# Patient Record
Sex: MALE | Race: WHITE | NOT HISPANIC OR LATINO | URBAN - METROPOLITAN AREA
[De-identification: names, ages, dates, MRNs, and addresses within clinical notes are randomized per-mention and may not be internally consistent; named-entity substitution may affect disease eponyms.]

---

## 2021-05-31 ENCOUNTER — APPOINTMENT (OUTPATIENT)
Dept: RADIOLOGY | Facility: IMAGING CENTER | Age: 26
End: 2021-05-31
Attending: PHYSICIAN ASSISTANT
Payer: OTHER MISCELLANEOUS

## 2021-05-31 ENCOUNTER — OFFICE VISIT (OUTPATIENT)
Dept: URGENT CARE | Facility: CLINIC | Age: 26
End: 2021-05-31
Payer: OTHER MISCELLANEOUS

## 2021-05-31 VITALS
RESPIRATION RATE: 16 BRPM | WEIGHT: 210.4 LBS | DIASTOLIC BLOOD PRESSURE: 74 MMHG | HEIGHT: 72 IN | SYSTOLIC BLOOD PRESSURE: 138 MMHG | TEMPERATURE: 97.5 F | BODY MASS INDEX: 28.5 KG/M2 | HEART RATE: 83 BPM | OXYGEN SATURATION: 99 %

## 2021-05-31 DIAGNOSIS — S62.353A CLOSED NONDISPLACED FRACTURE OF SHAFT OF THIRD METACARPAL BONE OF LEFT HAND, INITIAL ENCOUNTER: ICD-10-CM

## 2021-05-31 DIAGNOSIS — S66.912A STRAIN OF LEFT HAND, INITIAL ENCOUNTER: ICD-10-CM

## 2021-05-31 PROCEDURE — 99204 OFFICE O/P NEW MOD 45 MIN: CPT | Performed by: PHYSICIAN ASSISTANT

## 2021-05-31 PROCEDURE — 73130 X-RAY EXAM OF HAND: CPT | Mod: TC,LT | Performed by: PHYSICIAN ASSISTANT

## 2021-05-31 PROCEDURE — A4590 SPECIAL CASTING MATERIAL: HCPCS | Performed by: PHYSICIAN ASSISTANT

## 2021-05-31 RX ORDER — DEXTROAMPHETAMINE SACCHARATE, AMPHETAMINE ASPARTATE, DEXTROAMPHETAMINE SULFATE AND AMPHETAMINE SULFATE 5; 5; 5; 5 MG/1; MG/1; MG/1; MG/1
30 TABLET ORAL
COMMUNITY

## 2021-05-31 ASSESSMENT — ENCOUNTER SYMPTOMS
FEVER: 0
VOMITING: 0
CHILLS: 0
SENSORY CHANGE: 0
NAUSEA: 0
TINGLING: 0

## 2021-05-31 NOTE — PROGRESS NOTES
Subjective:     Ming Owens  is a 25 y.o. male who presents for Arm Injury (left arm injury, was hit on top of the hand with a baseball)      Arm Injury  Pertinent negatives include no chills, fever, nausea, rash or vomiting.   Patient presents to the urgent care 1 to 2 hours status post injury to left hand.  Patient is a  and was batting in a right-handed batting stance when a pitch impacted his left hand.  Patient complains of pain and swelling to dorsum of left hand primarily over second and third digits.  Patient is right-hand dominant for throwing as well.  Denies crepitus.  Denies numbness tingling or weakness.  Notes past medical history of hook of hamate removed of left wrist.  Denies other surgeries or significant injuries to left hand or wrist.  Patient is treated with ice thus far.    Review of Systems   Constitutional: Negative for chills and fever.   Gastrointestinal: Negative for nausea and vomiting.   Musculoskeletal: Positive for joint pain ( Left hand pain).   Skin: Negative for rash.   Neurological: Negative for tingling and sensory change.       Medications:    • amphetamine-dextroamphetamine Tabs    Allergies: Patient has no known allergies.    Problem List: Ming Owens does not have a problem list on file.    Surgical History:  No past surgical history on file.    Past Social Hx: Ming Owens  reports that he has never smoked. He has never used smokeless tobacco. He reports current alcohol use. He reports that he does not use drugs.     Past Family Hx:  Ming Owens family history is not on file.     Problem list, medications, and allergies reviewed by myself today in Epic.     Objective:   /74 (BP Location: Left arm, Patient Position: Sitting, BP Cuff Size: Adult)   Pulse 83   Temp 36.4 °C (97.5 °F) (Temporal)   Resp 16   Ht 1.829 m (6')   Wt 95.4 kg (210 lb 6.4 oz)   SpO2 99%   BMI 28.54 kg/m²     Physical Exam  Vitals and nursing note reviewed.    Constitutional:       General: He is not in acute distress.     Appearance: He is well-developed. He is not diaphoretic.   HENT:      Head: Normocephalic and atraumatic.      Right Ear: External ear normal.      Left Ear: External ear normal.      Nose: Nose normal.   Eyes:      General: No scleral icterus.        Right eye: No discharge.         Left eye: No discharge.      Conjunctiva/sclera: Conjunctivae normal.   Pulmonary:      Effort: Pulmonary effort is normal. No respiratory distress.   Musculoskeletal:         General: Normal range of motion.      Left hand: Swelling ( ) and tenderness present. No lacerations. Normal range of motion. Normal strength. Normal sensation. There is no disruption of two-point discrimination. Normal pulse.        Hands:       Cervical back: Neck supple.   Skin:     General: Skin is warm and dry.      Coloration: Skin is not pale.   Neurological:      Mental Status: He is alert and oriented to person, place, and time.      Coordination: Coordination normal.     DX hand-  5/31/2021 4:32 PM     HISTORY/REASON FOR EXAM:  Pain/Deformity Following Trauma.  Pain after injury     TECHNIQUE/EXAM DESCRIPTION AND NUMBER OF VIEWS:  3 views of the LEFT hand.     COMPARISON: None     FINDINGS:  There is a nondisplaced transverse third metacarpal shaft fracture.  No other fracture or dislocation.  The joint spaces are preserved.  Bone mineralization is age-appropriate..        IMPRESSION:     Nondisplaced third metacarpal shaft fracture.         Last Resulted: 05/31/21  4:51 PM        Pt placed in radial gutter orthoglass splint.  CMS intact pre and post splinting. Splint care instructions discussed.  Do not remove until seen by otho.  Referral placed. Avoid nsaids until advised otherwise by ortho.       Assessment/Plan:   Assessment      1. Closed nondisplaced fracture of shaft of third metacarpal bone of left hand, initial encounter    Other orders  - amphetamine-dextroamphetamine (ADDERALL)  20 MG Tab; Take 30 mg by mouth 1 time a day as needed.  - DX-HAND 3+ LEFT; Future  Recommend conservative care, splinted, OTC tylenol, f/u w/ baseball team doctor.  Return to clinic with lack of resolution or progression of symptoms.      I have worn an N95 mask, gloves and eye protection for the entire encounter with this patient.     My total time spent caring for the patient on the day of the encounter was 45 minutes.   This does not include time spent on separately billable procedures/tests.      Differential diagnosis, natural history, supportive care, and indications for immediate follow-up discussed.

## 2022-08-08 ENCOUNTER — OUTPATIENT (OUTPATIENT)
Dept: OUTPATIENT SERVICES | Facility: HOSPITAL | Age: 27
LOS: 1 days | End: 2022-08-08

## 2022-08-08 ENCOUNTER — APPOINTMENT (OUTPATIENT)
Dept: ORTHOPEDIC SURGERY | Facility: CLINIC | Age: 27
End: 2022-08-08
Payer: SELF-PAY

## 2022-08-08 ENCOUNTER — APPOINTMENT (OUTPATIENT)
Dept: MRI IMAGING | Facility: CLINIC | Age: 27
End: 2022-08-08

## 2022-08-08 VITALS
SYSTOLIC BLOOD PRESSURE: 141 MMHG | WEIGHT: 215 LBS | BODY MASS INDEX: 29.12 KG/M2 | DIASTOLIC BLOOD PRESSURE: 78 MMHG | HEIGHT: 72 IN | HEART RATE: 94 BPM

## 2022-08-08 DIAGNOSIS — S76.112A STRAIN OF LEFT QUADRICEPS MUSCLE, FASCIA AND TENDON, INITIAL ENCOUNTER: ICD-10-CM

## 2022-08-08 PROBLEM — Z00.00 ENCOUNTER FOR PREVENTIVE HEALTH EXAMINATION: Status: ACTIVE | Noted: 2022-08-08

## 2022-08-08 PROCEDURE — ZZZZZ: CPT

## 2022-08-08 PROCEDURE — 73718 MRI LOWER EXTREMITY W/O DYE: CPT | Mod: 26,LT

## 2022-08-08 NOTE — PHYSICAL EXAM
[LE] : Sensory: Intact in bilateral lower extremities [DP] : dorsalis pedis 2+ and symmetric bilaterally [de-identified] : Walking without the use of any assistive aids.  Left lower extremity: Diffuse swelling/ecchymosis and tenderness anterior aspect left thigh.  He can actively flex and rotate the left hip against manual resistance.  Left knee: Healed surgical scar from previous ACL reconstruction.  No focal tenderness about the left knee.  0 to 125 degrees left knee flexion.  Midline patellar tracking.  Crepitus with knee flexion.  Left knee is stable with varus/valgus and Lachman exam testing.  No calf tenderness.  He can actively flex and extend the left knee against manual resistance.  [de-identified] : AP pelvis, AP and lateral left hip and AP and lateral femur views taken in the office today: No fractures.  No significant arthritic changes.  No calcifications in the soft tissues.

## 2022-08-08 NOTE — HISTORY OF PRESENT ILLNESS
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   For the last few weeks he states he has been experiencing pain and swelling in the left thigh.  Symptoms have worsened. He brought this to the attention of the training staff and is sent over for evaluation.  He notes increased difficulty running.  He denies the use of any supplements or injections. [Bending] : worsened by bending [Running] : worsened by running [Rest] : relieved by rest

## 2022-08-08 NOTE — DISCUSSION/SUMMARY
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  Cirilo has swelling/tenderness and ecchymosis to the left thigh.  Concern for significant quadriceps injury.  Recommend MRI to evaluate further.  Will discuss findings and further care after the MRI.

## 2023-05-30 ENCOUNTER — OUTPATIENT (OUTPATIENT)
Dept: OUTPATIENT SERVICES | Facility: HOSPITAL | Age: 28
LOS: 1 days | End: 2023-05-30
Payer: SELF-PAY

## 2023-05-30 ENCOUNTER — RESULT REVIEW (OUTPATIENT)
Age: 28
End: 2023-05-30

## 2023-05-30 DIAGNOSIS — Z00.00 ENCOUNTER FOR GENERAL ADULT MEDICAL EXAMINATION WITHOUT ABNORMAL FINDINGS: ICD-10-CM

## 2023-05-30 PROCEDURE — 73140 X-RAY EXAM OF FINGER(S): CPT | Mod: 26,LT
